# Patient Record
(demographics unavailable — no encounter records)

---

## 2025-03-04 NOTE — CONSULT LETTER
[Dear  ___] : Dear  [unfilled], [Consult Letter:] : I had the pleasure of evaluating your patient, [unfilled]. [Please see my note below.] : Please see my note below. [Consult Closing:] : Thank you very much for allowing me to participate in the care of this patient.  If you have any questions, please do not hesitate to contact me. [Sincerely,] : Sincerely, [FreeTextEntry2] : Jacobo Adam MD [address] [place], [place] [id number]  Phone: [phone] [FreeTextEntry3] : Cresencio Jackson MD Division of Pediatric, General, Thoracic and Endoscopic Surgery [facility]

## 2025-03-04 NOTE — ADDENDUM
[FreeTextEntry1] : Documented by Adonis Bedolla acting as a scribe for Dr. Jackson on 03/03/2025.   All medical record entries made by the Scribe were at my, Dr. Jackson, direction and personally dictated by me on 03/03/2025. I have reviewed the chart and agree that the record accurately reflects my personal performances of the history, physical exam, assessment and plan. I have also personally directed, reviewed, and agree with the instructions.

## 2025-03-04 NOTE — REASON FOR VISIT
[Initial - Scheduled] : an initial, scheduled visit with concerns of [Other: ____] : [unfilled] [Patient] : patient [Parents] : parents [FreeTextEntry4] : Dr Jacobo Adam

## 2025-03-04 NOTE — CONSULT LETTER
[Dear  ___] : Dear  [unfilled], [Consult Letter:] : I had the pleasure of evaluating your patient, [unfilled]. [Please see my note below.] : Please see my note below. [Consult Closing:] : Thank you very much for allowing me to participate in the care of this patient.  If you have any questions, please do not hesitate to contact me. [Sincerely,] : Sincerely, [FreeTextEntry2] : Jacobo Adam MD [address] [place], [place] [address]  Phone: [phone] [FreeTextEntry3] : Cresencio Jackson MD Division of Pediatric, General, Thoracic and Endoscopic Surgery [facility]

## 2025-03-04 NOTE — ASSESSMENT
[FreeTextEntry1] : Yaniv is a 16 day old, ex 34 week, boy who was prenatally diagnosed with a left fetal lung lesion. He has been asymptomatic and is overall doing very well. His chest x-ray after birth demonstrated a possible lesion of the left lobe. Today, Yaniv is very well appearing with normal breath sounds bilaterally. I educated mom and dad again about fetal lung lesions an offered reassurance. I again discussed the likelihood that his lesion persists despite it not being visualized on the recent prenatal imaging and have recommended a CT scan of the chest with IV contrast to assess for the presence of the lesion. We will arrange for the CT scan at 3-4 months of age given his prematurity. They will follow up with me after the CT scan to review the results. They understand that should the lesion persist, that I would recommend surgical resection. I again reviewed the indications for resection including the possibility of recurrent infections and the very low possibility of malignant transformation. I discussed the procedure in detail that would be a thoracoscopic lobectomy. I again reviewed the risks of the procedure including but not limited to bleeding, infection, injury to adjacent structures, and persistent post operative air leak. I reviewed postoperative expectations for both the short and long term. Mom and dad demonstrate understanding and agree with the plan. They will follow up with me after the CT scan to review the results and determine next steps. They know to contact me sooner should they have any further questions or concerns or should Yaniv develop any new or concerning symptoms.

## 2025-03-04 NOTE — HISTORY OF PRESENT ILLNESS
[FreeTextEntry1] : Yaniv is a 16 day old, ex 34 week, boy who was prenatally diagnosed with a fetal left lung lesion.  He initially required respiratory support attributed to his prematurity that was quickly weaned off.  He had a chest x-ray performed which visualized a haziness in the left lung, possibly secondary to the lesion. Since discharge, Yaniv has been doing well at home and the parents deny any respiratory distress. He is feeding well without respiratory symptoms or emesis and he is making normal wet diapers.  He has not had any fevers, cough, cyanosis or tachypnea.